# Patient Record
Sex: MALE | Race: WHITE | HISPANIC OR LATINO | Employment: UNEMPLOYED | URBAN - METROPOLITAN AREA
[De-identification: names, ages, dates, MRNs, and addresses within clinical notes are randomized per-mention and may not be internally consistent; named-entity substitution may affect disease eponyms.]

---

## 2017-09-11 PROBLEM — R22.32 MASS OF LEFT HAND: Status: ACTIVE | Noted: 2017-09-11

## 2017-10-02 PROBLEM — E66.9 OBESITY, CLASS I, BMI 30-34.9: Status: ACTIVE | Noted: 2017-10-02

## 2017-10-02 PROBLEM — R03.0 ELEVATED BLOOD PRESSURE READING: Status: ACTIVE | Noted: 2017-10-02

## 2020-05-23 ENCOUNTER — HOSPITAL ENCOUNTER (EMERGENCY)
Facility: HOSPITAL | Age: 40
Discharge: HOME | End: 2020-05-23
Attending: EMERGENCY MEDICINE

## 2020-05-23 VITALS
SYSTOLIC BLOOD PRESSURE: 162 MMHG | HEART RATE: 77 BPM | RESPIRATION RATE: 17 BRPM | TEMPERATURE: 98 F | DIASTOLIC BLOOD PRESSURE: 100 MMHG | OXYGEN SATURATION: 97 %

## 2020-05-23 DIAGNOSIS — H57.11 ACUTE RIGHT EYE PAIN: Primary | ICD-10-CM

## 2020-05-23 DIAGNOSIS — H10.9 CONJUNCTIVITIS OF RIGHT EYE, UNSPECIFIED CONJUNCTIVITIS TYPE: ICD-10-CM

## 2020-05-23 DIAGNOSIS — H01.001 BLEPHARITIS OF RIGHT UPPER EYELID, UNSPECIFIED TYPE: ICD-10-CM

## 2020-05-23 PROCEDURE — 99284 PR EMERGENCY DEPT VISIT,LEVEL IV: ICD-10-PCS | Mod: ,,, | Performed by: EMERGENCY MEDICINE

## 2020-05-23 PROCEDURE — 25000003 PHARM REV CODE 250: Performed by: EMERGENCY MEDICINE

## 2020-05-23 PROCEDURE — 99282 EMERGENCY DEPT VISIT SF MDM: CPT

## 2020-05-23 PROCEDURE — 99284 EMERGENCY DEPT VISIT MOD MDM: CPT | Mod: ,,, | Performed by: EMERGENCY MEDICINE

## 2020-05-23 RX ORDER — TETRACAINE HYDROCHLORIDE 5 MG/ML
2 SOLUTION OPHTHALMIC
Status: COMPLETED | OUTPATIENT
Start: 2020-05-23 | End: 2020-05-23

## 2020-05-23 RX ADMIN — FLUORESCEIN SODIUM 1 EACH: 1 STRIP OPHTHALMIC at 02:05

## 2020-05-23 RX ADMIN — TETRACAINE HYDROCHLORIDE 2 DROP: 5 SOLUTION OPHTHALMIC at 02:05

## 2020-05-23 NOTE — ED PROVIDER NOTES
Encounter Date: 5/23/2020       History     Chief Complaint   Patient presents with    Eye Problem     pt was cutting ceramic and thinks a piece went into his eye. No blurred vision; eye painful; redness noted.      HPI   Mr. Snyder is a 40 y.o. male with HTN here today with right eye pain. Reports 2-3 days ago he was doing yard work when something ceramic his right eye.  Complaining of burning eye pain since then.  Feels like there is something stuck in there.  Went to ED earlier tonight where they had normal exam.  His right eye was thoroughly irrigated and there were no foreign body seen.  No corneal abrasions.  Was discharged on erythromycin ointment.  Reports he is still having pain.  And thus he came here for evaluation.  Denies changes in vision, numbness, tingling, weakness.     Review of patient's allergies indicates:  No Known Allergies  Past Medical History:   Diagnosis Date    Hypertension     Mass of left hand     Obesity      No past surgical history on file.  No family history on file.  Social History     Tobacco Use    Smoking status: Never Smoker    Smokeless tobacco: Never Used   Substance Use Topics    Alcohol use: No    Drug use: No     Review of Systems   Constitutional: Negative for fever.   HENT: Negative for sore throat.    Eyes: Positive for pain, discharge and redness. Negative for photophobia, itching and visual disturbance.   Respiratory: Negative for shortness of breath.    Cardiovascular: Negative for chest pain.   Gastrointestinal: Negative for nausea.   Genitourinary: Negative for dysuria.   Musculoskeletal: Negative for back pain.   Skin: Negative for rash.   Neurological: Negative for weakness.   Hematological: Does not bruise/bleed easily.       Physical Exam     Initial Vitals [05/23/20 0234]   BP Pulse Resp Temp SpO2   (!) 162/100 77 17 98 °F (36.7 °C) 97 %      MAP       --         Physical Exam    Nursing note and vitals reviewed.  Constitutional: He appears  well-developed and well-nourished. He is not diaphoretic. No distress.   HENT:   Head: Normocephalic and atraumatic.   Right Ear: External ear normal.   Left Ear: External ear normal.   Eyes: EOM are normal. Pupils are equal, round, and reactive to light. Lids are everted and swept, no foreign bodies found. Right conjunctiva is injected. Left conjunctiva is injected.   Slit lamp exam:       The right eye shows fluorescein uptake (diffuse). The right eye shows no corneal abrasion, no corneal ulcer, no foreign body, no hyphema and no hypopyon.   Small subconjunctival hemorrhage at 12 o clock   Neck: Neck supple.   Cardiovascular: Normal rate, regular rhythm, normal heart sounds and intact distal pulses.   Pulmonary/Chest: Breath sounds normal. No respiratory distress. He has no wheezes. He has no rhonchi. He has no rales.   Abdominal: Soft. He exhibits no distension. There is no tenderness. There is no rebound and no guarding.   Neurological: He is alert and oriented to person, place, and time. GCS score is 15. GCS eye subscore is 4. GCS verbal subscore is 5. GCS motor subscore is 6.   Skin: Skin is warm. Capillary refill takes less than 2 seconds. No rash noted.   Psychiatric: He has a normal mood and affect.         ED Course   Procedures  Labs Reviewed - No data to display       Imaging Results    None          Medical Decision Making:   History:   Old Medical Records: I decided to obtain old medical records.  Old Records Summarized: other records.       <> Summary of Records: Seen at [facility] ED earlier with negative exam.  ED Management:  Here with right eye pain.  Visual acuity normal.  No obvious foreign body on exam.  Inverted lids and swept with out foreign body being present.  Was thoroughly irrigated.  No evidence of corneal abrasion.  Has diffuse uptake consistent with conjunctivitis.  Will discharge with previous treatment.  Stable for discharge this time.  Return precautions discussed.                                  Clinical Impression:       ICD-10-CM ICD-9-CM   1. Acute right eye pain H57.11 379.91   2. Blepharitis of right upper eyelid, unspecified type H01.001 373.00   3. Conjunctivitis of right eye, unspecified conjunctivitis type H10.9 372.30             ED Disposition Condition    Discharge Stable        ED Prescriptions     None        Follow-up Information     Follow up With Specialties Details Why Contact Info    Rick Gomez Jr., MD Family Medicine  As needed [address] [facility] [address]  [phone]      [facility] Emergency Medicine  If symptoms worsen [facility] [facility] [address] [phone]                                     Derek Doan MD  05/23/20 0332       Derek Doan MD  05/23/20 0337

## 2022-08-18 ENCOUNTER — HOSPITAL ENCOUNTER (EMERGENCY)
Facility: HOSPITAL | Age: 42
Discharge: HOME | End: 2022-08-18
Attending: EMERGENCY MEDICINE

## 2022-08-18 VITALS
SYSTOLIC BLOOD PRESSURE: 164 MMHG | RESPIRATION RATE: 18 BRPM | WEIGHT: 187 LBS | HEART RATE: 66 BPM | BODY MASS INDEX: 30.05 KG/M2 | OXYGEN SATURATION: 99 % | DIASTOLIC BLOOD PRESSURE: 98 MMHG | HEIGHT: 66 IN | TEMPERATURE: 99 F

## 2022-08-18 DIAGNOSIS — R51.9 NONINTRACTABLE HEADACHE, UNSPECIFIED CHRONICITY PATTERN, UNSPECIFIED HEADACHE TYPE: ICD-10-CM

## 2022-08-18 DIAGNOSIS — R07.89 CHEST WALL PAIN: Primary | ICD-10-CM

## 2022-08-18 DIAGNOSIS — R07.9 CHEST PAIN: ICD-10-CM

## 2022-08-18 DIAGNOSIS — K21.9 GASTROESOPHAGEAL REFLUX DISEASE, UNSPECIFIED WHETHER ESOPHAGITIS PRESENT: ICD-10-CM

## 2022-08-18 DIAGNOSIS — R07.9 LEFT-SIDED CHEST PAIN: ICD-10-CM

## 2022-08-18 DIAGNOSIS — R79.89 ELEVATED LFTS: ICD-10-CM

## 2022-08-18 LAB
ALBUMIN SERPL BCP-MCNC: 4.8 G/DL (ref 3.5–5.2)
ALP SERPL-CCNC: 95 U/L (ref 55–135)
ALT SERPL W/O P-5'-P-CCNC: 98 U/L (ref 10–44)
ANION GAP SERPL CALC-SCNC: 10 MMOL/L (ref 8–16)
AST SERPL-CCNC: 42 U/L (ref 10–40)
BASOPHILS # BLD AUTO: 0.03 K/UL (ref 0–0.2)
BASOPHILS NFR BLD: 0.4 % (ref 0–1.9)
BILIRUB SERPL-MCNC: 0.9 MG/DL (ref 0.1–1)
BUN SERPL-MCNC: 16 MG/DL (ref 6–20)
CALCIUM SERPL-MCNC: 10.6 MG/DL (ref 8.7–10.5)
CHLORIDE SERPL-SCNC: 100 MMOL/L (ref 95–110)
CO2 SERPL-SCNC: 28 MMOL/L (ref 23–29)
CREAT SERPL-MCNC: 0.8 MG/DL (ref 0.5–1.4)
DIFFERENTIAL METHOD: NORMAL
EOSINOPHIL # BLD AUTO: 0 K/UL (ref 0–0.5)
EOSINOPHIL NFR BLD: 0.5 % (ref 0–8)
ERYTHROCYTE [DISTWIDTH] IN BLOOD BY AUTOMATED COUNT: 12.9 % (ref 11.5–14.5)
EST. GFR  (NO RACE VARIABLE): >60 ML/MIN/1.73 M^2
GLUCOSE SERPL-MCNC: 180 MG/DL (ref 70–110)
HCT VFR BLD AUTO: 43.5 % (ref 40–54)
HGB BLD-MCNC: 15.1 G/DL (ref 14–18)
IMM GRANULOCYTES # BLD AUTO: 0.02 K/UL (ref 0–0.04)
IMM GRANULOCYTES NFR BLD AUTO: 0.3 % (ref 0–0.5)
LYMPHOCYTES # BLD AUTO: 1.6 K/UL (ref 1–4.8)
LYMPHOCYTES NFR BLD: 20.9 % (ref 18–48)
MCH RBC QN AUTO: 30 PG (ref 27–31)
MCHC RBC AUTO-ENTMCNC: 34.7 G/DL (ref 32–36)
MCV RBC AUTO: 86 FL (ref 82–98)
MONOCYTES # BLD AUTO: 0.6 K/UL (ref 0.3–1)
MONOCYTES NFR BLD: 8.1 % (ref 4–15)
NEUTROPHILS # BLD AUTO: 5.3 K/UL (ref 1.8–7.7)
NEUTROPHILS NFR BLD: 69.8 % (ref 38–73)
NRBC BLD-RTO: 0 /100 WBC
PLATELET # BLD AUTO: 270 K/UL (ref 150–450)
PMV BLD AUTO: 9.5 FL (ref 9.2–12.9)
POTASSIUM SERPL-SCNC: 3.9 MMOL/L (ref 3.5–5.1)
PROT SERPL-MCNC: 8.5 G/DL (ref 6–8.4)
RBC # BLD AUTO: 5.04 M/UL (ref 4.6–6.2)
SODIUM SERPL-SCNC: 138 MMOL/L (ref 136–145)
TROPONIN I SERPL DL<=0.01 NG/ML-MCNC: <0.006 NG/ML (ref 0–0.03)
WBC # BLD AUTO: 7.52 K/UL (ref 3.9–12.7)

## 2022-08-18 PROCEDURE — 25000003 PHARM REV CODE 250: Performed by: PHYSICIAN ASSISTANT

## 2022-08-18 PROCEDURE — 25000003 PHARM REV CODE 250: Performed by: EMERGENCY MEDICINE

## 2022-08-18 PROCEDURE — 96374 THER/PROPH/DIAG INJ IV PUSH: CPT

## 2022-08-18 PROCEDURE — 99284 PR EMERGENCY DEPT VISIT,LEVEL IV: ICD-10-PCS | Mod: ,,, | Performed by: PHYSICIAN ASSISTANT

## 2022-08-18 PROCEDURE — 93010 ELECTROCARDIOGRAM REPORT: CPT | Mod: ,,, | Performed by: INTERNAL MEDICINE

## 2022-08-18 PROCEDURE — 63600175 PHARM REV CODE 636 W HCPCS: Performed by: PHYSICIAN ASSISTANT

## 2022-08-18 PROCEDURE — 96375 TX/PRO/DX INJ NEW DRUG ADDON: CPT

## 2022-08-18 PROCEDURE — 93005 ELECTROCARDIOGRAM TRACING: CPT

## 2022-08-18 PROCEDURE — 80053 COMPREHEN METABOLIC PANEL: CPT | Performed by: EMERGENCY MEDICINE

## 2022-08-18 PROCEDURE — 99284 EMERGENCY DEPT VISIT MOD MDM: CPT | Mod: ,,, | Performed by: PHYSICIAN ASSISTANT

## 2022-08-18 PROCEDURE — 96361 HYDRATE IV INFUSION ADD-ON: CPT

## 2022-08-18 PROCEDURE — 99285 EMERGENCY DEPT VISIT HI MDM: CPT | Mod: 25

## 2022-08-18 PROCEDURE — 84484 ASSAY OF TROPONIN QUANT: CPT | Performed by: EMERGENCY MEDICINE

## 2022-08-18 PROCEDURE — 93010 EKG 12-LEAD: ICD-10-PCS | Mod: ,,, | Performed by: INTERNAL MEDICINE

## 2022-08-18 PROCEDURE — 85025 COMPLETE CBC W/AUTO DIFF WBC: CPT | Performed by: EMERGENCY MEDICINE

## 2022-08-18 RX ORDER — OMEPRAZOLE 20 MG/1
20 CAPSULE, DELAYED RELEASE ORAL DAILY
Qty: 30 CAPSULE | Refills: 0 | Status: SHIPPED | OUTPATIENT
Start: 2022-08-18 | End: 2023-08-18

## 2022-08-18 RX ORDER — METOCLOPRAMIDE HYDROCHLORIDE 5 MG/ML
10 INJECTION INTRAMUSCULAR; INTRAVENOUS
Status: COMPLETED | OUTPATIENT
Start: 2022-08-18 | End: 2022-08-18

## 2022-08-18 RX ORDER — MAG HYDROX/ALUMINUM HYD/SIMETH 200-200-20
30 SUSPENSION, ORAL (FINAL DOSE FORM) ORAL ONCE
Status: COMPLETED | OUTPATIENT
Start: 2022-08-18 | End: 2022-08-18

## 2022-08-18 RX ORDER — MAG HYDROX/ALUMINUM HYD/SIMETH 200-200-20
30 SUSPENSION, ORAL (FINAL DOSE FORM) ORAL
Status: DISCONTINUED | OUTPATIENT
Start: 2022-08-18 | End: 2022-08-18

## 2022-08-18 RX ORDER — LIDOCAINE 50 MG/G
1 PATCH TOPICAL ONCE
Status: DISCONTINUED | OUTPATIENT
Start: 2022-08-18 | End: 2022-08-18 | Stop reason: HOSPADM

## 2022-08-18 RX ORDER — KETOROLAC TROMETHAMINE 30 MG/ML
15 INJECTION, SOLUTION INTRAMUSCULAR; INTRAVENOUS
Status: COMPLETED | OUTPATIENT
Start: 2022-08-18 | End: 2022-08-18

## 2022-08-18 RX ORDER — ACETAMINOPHEN 500 MG
1000 TABLET ORAL
Status: DISCONTINUED | OUTPATIENT
Start: 2022-08-18 | End: 2022-08-18

## 2022-08-18 RX ORDER — LIDOCAINE HYDROCHLORIDE 20 MG/ML
15 SOLUTION OROPHARYNGEAL ONCE
Status: COMPLETED | OUTPATIENT
Start: 2022-08-18 | End: 2022-08-18

## 2022-08-18 RX ADMIN — SODIUM CHLORIDE 1000 ML: 0.9 INJECTION, SOLUTION INTRAVENOUS at 02:08

## 2022-08-18 RX ADMIN — LIDOCAINE 1 PATCH: 50 PATCH TOPICAL at 02:08

## 2022-08-18 RX ADMIN — ALUMINUM HYDROXIDE, MAGNESIUM HYDROXIDE, AND SIMETHICONE 30 ML: 200; 200; 20 SUSPENSION ORAL at 02:08

## 2022-08-18 RX ADMIN — KETOROLAC TROMETHAMINE 15 MG: 30 INJECTION, SOLUTION INTRAMUSCULAR; INTRAVENOUS at 02:08

## 2022-08-18 RX ADMIN — LIDOCAINE HYDROCHLORIDE 15 ML: 20 SOLUTION ORAL; TOPICAL at 02:08

## 2022-08-18 RX ADMIN — METOCLOPRAMIDE 10 MG: 5 INJECTION, SOLUTION INTRAMUSCULAR; INTRAVENOUS at 02:08

## 2022-08-18 NOTE — DISCHARGE INSTRUCTIONS
Take ibuprofen 600-800 mg every 6 hours as needed with foods for anti-inflammatory relief.  You can take acetaminophen/tylenol 650 mg every 6 hours or 1000 mg every 8 hours for added relief.  Apply ice to the area for 10-20 minutes every 4 hours. You can apply heat 2 days after for the same duration and frequency.  Follow up with PCP if your symptoms do not improve.   Return to the ER for new or worsening symptoms.  Labs Reviewed   COMPREHENSIVE METABOLIC PANEL - Abnormal; Notable for the following components:       Result Value    Glucose 180 (*)     Calcium 10.6 (*)     Total Protein 8.5 (*)     AST 42 (*)     ALT 98 (*)     All other components within normal limits   CBC W/ AUTO DIFFERENTIAL   TROPONIN I     Imaging Results              X-Ray Chest PA And Lateral (Final result)  Result time 08/18/22 15:28:38      Final result by Frnaky Hensley MD (08/18/22 15:28:38)                   Impression:      See above      Electronically signed by: Franky Hensley MD  Date:    08/18/2022  Time:    15:28               Narrative:    EXAMINATION:  XR CHEST PA AND LATERAL    CLINICAL HISTORY:  Chest pain, unspecified    TECHNIQUE:  PA and lateral views of the chest were performed.    COMPARISON:  None    FINDINGS:  Heart size normal.  The lungs are clear.  No pleural effusion

## 2022-08-18 NOTE — Clinical Note
"Rod Ruggiero" Claudio was seen and treated in our emergency department on 8/18/2022.  He may return to work on 08/22/2022.       If you have any questions or concerns, please don't hesitate to call.      Wendy Hairston PA-C"

## 2022-08-18 NOTE — ED NOTES
Patient identifiers verified and correct for Mr Snyder  C/C: Left CP SEE NNAPPEARANCE: awake and alert in NAD.  SKIN: warm, dry and intact. No breakdown or bruising.  MUSCULOSKELETAL: Patient moving all extremities spontaneously, no obvious swelling or deformities noted. Ambulates independently.  RESPIRATORY: Denies shortness of breath.Respirations unlabored.   CARDIAC: Positive left CP, 2+ distal pulses; no peripheral edema  ABDOMEN: S/ND/NT, Denies nausea  : voids spontaneously, denies difficulty  Neurologic: AAO x 4; follows commands equal strength in all extremities; denies numbness/tingling. Denies dizziness denies weakness

## 2022-08-18 NOTE — PROVIDER PROGRESS NOTES - EMERGENCY DEPT.
Encounter Date: 8/18/2022    ED Physician Progress Notes         EKG - STEMI Decision  Initial Reading: No STEMI present.  Response: No Action Needed.

## 2022-08-18 NOTE — FIRST PROVIDER EVALUATION
"Medical screening exam completed.  I have conducted a focused provider triage encounter, findings are as follows:    Brief history of present illness: 42 M here for substernal chest pain since last night.  No radiation. Also reports headache, upper abdominal pain.  Has pain last week but took his blood pressure medicine then pain resolved.     Vitals:    08/18/22 1208   BP: (!) 174/106   Pulse: 88   Resp: 14   Temp: 98.5 °F (36.9 °C)   SpO2: 99%   Weight: 84.8 kg (187 lb)   Height: 5' 6" (1.676 m)       Pertinent physical exam:  Well appearing, no distress    Brief workup plan:  Labs, ekg, gi cocktail    Preliminary workup initiated; this workup will be continued and followed by the physician or advanced practice provider that is assigned to the patient when roomed.  "

## 2022-08-18 NOTE — ED PROVIDER NOTES
Encounter Date: 8/18/2022       History     Chief Complaint   Patient presents with    Chest Pain     Pt c/o chest pain that began on Friday.  Also c/o abdominal pain and headache.     2:12 PM  Patient is a 42-year-old male with a history of HTN, dm, HLD who [place] [facility] [place] with multiple complaints.  He states for the past 1 month he has noted intermittent pains to his left chest.      Last week, he noted that his symptoms were more frequent.  He has noted while at work, but also mainly at home in the evenings when he is resting.  He has had it when he is even sleeping.  Currently complains of 4/10 pain to the area.  In addition he has had a headache for the past 6 days.  He states on day 1 he had mild bilateral temporal pain, and next day he felt like his pain became worse.  Currently it is located to his frontal head and sometimes to his teeth.  He is complaining of 10/10 pain to his head.  He has also had generalized abdominal pain onset about 5 days ago.  His bowel movements have been very little and small.  No diarrhea.  He denies tobacco use.  Drinks about 1 beer 1-2 times a week.  He has tried over-the-counter Tylenol, ibuprofen, [place] with improvement in his pain which returns 2 hours later. He drove himself to the ED. He had neg home covid test about 2 days ago.        Review of patient's allergies indicates:  No Known Allergies  Past Medical History:   Diagnosis Date    Hypertension     Mass of left hand     Obesity      History reviewed. No pertinent surgical history.  History reviewed. No pertinent family history.  Social History     Tobacco Use    Smoking status: Never Smoker    Smokeless tobacco: Never Used   Substance Use Topics    Alcohol use: No    Drug use: No     Review of Systems   Constitutional: Positive for activity change and appetite change. Negative for chills, diaphoresis and fever.   HENT: Negative for sore throat.    Respiratory: Positive for cough and shortness of  breath.    Cardiovascular: Positive for chest pain.   Gastrointestinal: Positive for abdominal pain. Negative for diarrhea, nausea and vomiting.   Genitourinary: Negative for dysuria, frequency and hematuria.   Musculoskeletal: Negative for back pain.   Skin: Negative for rash.   Neurological: Negative for weakness, numbness and headaches.   Hematological: Does not bruise/bleed easily.       Physical Exam     Initial Vitals [08/18/22 1208]   BP Pulse Resp Temp SpO2   (!) 174/106 88 14 98.5 °F (36.9 °C) 99 %      MAP       --         Physical Exam    Vitals reviewed.  Constitutional: He appears well-developed and well-nourished. He is not diaphoretic. He is cooperative.  Non-toxic appearance. He does not have a sickly appearance. He does not appear ill. No distress. Face mask in place.   HENT:   Head: Normocephalic and atraumatic.   Nose: Nose normal.   Mouth/Throat: No trismus in the jaw.   Eyes: Conjunctivae and EOM are normal.   Neck:   Normal range of motion.  Cardiovascular: Normal rate and regular rhythm.   Pulmonary/Chest: Breath sounds normal. No accessory muscle usage. No tachypnea. No respiratory distress. He has no wheezes. He has no rhonchi. He has no rales. He exhibits tenderness.     Abdominal: He exhibits no distension.   Musculoskeletal:         General: Normal range of motion.      Cervical back: Normal range of motion.     Neurological: He is alert. He has normal strength.   Skin: Skin is dry. No pallor.         ED Course   Procedures  Labs Reviewed   COMPREHENSIVE METABOLIC PANEL - Abnormal; Notable for the following components:       Result Value    Glucose 180 (*)     Calcium 10.6 (*)     Total Protein 8.5 (*)     AST 42 (*)     ALT 98 (*)     All other components within normal limits   CBC W/ AUTO DIFFERENTIAL   TROPONIN I        ECG Results          EKG 12-lead (Final result)  Result time 08/18/22 13:56:59    Final result by Interface, Lab In [facility] (08/18/22 13:56:59)                  Narrative:    Test Reason : R07.9,    Vent. Rate : 076 BPM     Atrial Rate : 076 BPM     P-R Int : 152 ms          QRS Dur : 088 ms      QT Int : 376 ms       P-R-T Axes : 040 005 041 degrees     QTc Int : 423 ms    Normal sinus rhythm  Possible Left atrial enlargement  Borderline Abnormal ECG  When compared with ECG of 11-SEP-2017 13:32,  Non-specific change in ST segment in Inferior leads  Confirmed by LAM NORMAN MD ([id number]) on 8/18/2022 1:56:52 PM    Referred By: [facility]           Confirmed By:LAM NORMAN MD                            Imaging Results          X-Ray Chest PA And Lateral (Final result)  Result time 08/18/22 15:28:38    Final result by Franky Hensley MD (08/18/22 15:28:38)                 Impression:      See above      Electronically signed by: Franky Hensley MD  Date:    08/18/2022  Time:    15:28             Narrative:    EXAMINATION:  XR CHEST PA AND LATERAL    CLINICAL HISTORY:  Chest pain, unspecified    TECHNIQUE:  PA and lateral views of the chest were performed.    COMPARISON:  None    FINDINGS:  Heart size normal.  The lungs are clear.  No pleural effusion                                 Medications   aluminum-magnesium hydroxide-simethicone 200-200-20 mg/5 mL suspension 30 mL (30 mLs Oral Given 8/18/22 1449)     And   LIDOcaine HCl 2% oral solution 15 mL (15 mLs Oral Given 8/18/22 1449)   sodium chloride 0.9% bolus 1,000 mL (0 mLs Intravenous Stopped 8/18/22 1600)   metoclopramide HCl injection 10 mg (10 mg Intravenous Given 8/18/22 1442)   ketorolac injection 15 mg (15 mg Intravenous Given 8/18/22 1442)     Medical Decision Making:   Initial Assessment:   Patient is a 42-year-old male with a history of HTN, dm, HLD who presents to [facility] ED with multiple complaints.  He states for the past 1 month he has noted intermittent pains to his left chest.   Differential Diagnosis:   Includes but is not limited to palpitations, GERD, gastritis, anxiety, MSK pain, ACS, less likely PE,  tension headache, migraine headache, complex headache, hypertensive headache.  Patient's headache onset slow and worse in the next day.  Not characteristic of SAH.  No focal neural deficits to suggest other neurological abnormality.  Independently Interpreted Test(s):   I have ordered and independently interpreted EKG Reading(s) - see summary below  Clinical Tests:   Lab Tests: Ordered and Reviewed  Radiological Study: Ordered and Reviewed  Medical Tests: Ordered and Reviewed  ED Management:  On my independent interpretation, EKG with NSR at 76 ppm.  Possible left atrial enlargement.  Normal intervals.  No ischemic changes.  No STEMI.    Will initiate workup and continue monitor.              ED Course as of 08/19/22 0830   Thu Aug 18, 2022   1408 BP(!): 174/106 [CL]   1408 Temp: 98.5 °F (36.9 °C) [CL]   1408 Pulse: 88 [CL]   1408 Resp: 14 [CL]   1408 SpO2: 99 % [CL]   1408 WBC: 7.52 [CL]   1408 Hemoglobin: 15.1 [CL]   1408 Sodium: 138 [CL]   1408 Potassium: 3.9 [CL]   1408 Glucose(!): 180 [CL]   1408 Creatinine: 0.8 [CL]   1408 BILIRUBIN TOTAL: 0.9 [CL]   1408 AST(!): 42 [CL]   1408 ALT(!): 98 [CL]   1408 Troponin I: <0.006 [CL]      ED Course User Index  [CL] Wendy Hairston PA-C           Chest x-ray without acute abnormalities.    Patient reassessed.  He was sleeping.  He states that he feels significantly improved.  Workup without any acute processes.   He could have signs of acid reflux given that he has had symptoms even at nice when he is resting and laying down versus MSK pain since he had some tenderness to his left pectoralis muscle and some that was reproducible with left shoulder range of motion.  I discussed etiologies of both.  Start GERD medication. OTC ibuprofen.  He apparently has been told that his liver enzymes have been elevated before.  We discussed close follow-up.  Discontinue alcohol as well as acetaminophen.  Stay hydrated.  Return to ED precautions were given.  All of his questions were  answered.  Patient comfortable with plan and stable for discharge.    Clinical Impression:   Final diagnoses:  [R07.9] Chest pain  [R07.9] Left-sided chest pain  [R07.89] Chest wall pain (Primary)  [R79.89] Elevated LFTs  [R51.9] Nonintractable headache, unspecified chronicity pattern, unspecified headache type  [K21.9] Gastroesophageal reflux disease, unspecified whether esophagitis present          ED Disposition Condition    Discharge Stable        ED Prescriptions     Medication Sig Dispense Start Date End Date Auth. Provider    omeprazole (PRILOSEC) 20 MG capsule Take 1 capsule (20 mg total) by mouth once daily. 30 capsule 8/18/2022 8/18/2023 Wendy Hairston PA-C        Follow-up Information     Follow up With Specialties Details Why Contact Info    [facility]  Schedule an appointment as soon as possible for a visit   [address] [facility] [address]  [phone]      [facility] - Emergency Dept Emergency Medicine  If symptoms worsen [address] [facility] [facility] [address] [phone]           Wendy Hairston PA-C  08/19/22 0826

## 2022-11-10 ENCOUNTER — OFFICE VISIT (OUTPATIENT)
Dept: ORTHOPEDICS | Facility: CLINIC | Age: 42
End: 2022-11-10

## 2022-11-10 VITALS — HEIGHT: 65 IN | WEIGHT: 189.13 LBS | BODY MASS INDEX: 31.51 KG/M2
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
## 2022-11-10 DIAGNOSIS — M25.512 ACUTE PAIN OF LEFT SHOULDER: ICD-10-CM

## 2022-11-10 DIAGNOSIS — M87.039 AVASCULAR NECROSIS OF LUNATE: ICD-10-CM

## 2022-11-10 DIAGNOSIS — M25.531 RIGHT WRIST PAIN: Primary | ICD-10-CM

## 2022-11-10 PROCEDURE — 99214 OFFICE O/P EST MOD 30 MIN: CPT | Mod: PBBFAC,PN | Performed by: PHYSICIAN ASSISTANT

## 2022-11-10 PROCEDURE — 99202 OFFICE O/P NEW SF 15 MIN: CPT | Mod: S$PBB,,, | Performed by: PHYSICIAN ASSISTANT

## 2022-11-10 PROCEDURE — 99202 PR OFFICE/OUTPT VISIT, NEW, LEVL II, 15-29 MIN: ICD-10-PCS | Mod: S$PBB,,, | Performed by: PHYSICIAN ASSISTANT

## 2022-11-10 PROCEDURE — 99999 PR PBB SHADOW E&M-EST. PATIENT-LVL IV: ICD-10-PCS | Mod: PBBFAC,,, | Performed by: PHYSICIAN ASSISTANT

## 2022-11-10 PROCEDURE — 99999 PR PBB SHADOW E&M-EST. PATIENT-LVL IV: CPT | Mod: PBBFAC,,, | Performed by: PHYSICIAN ASSISTANT

## 2022-11-10 RX ORDER — ATORVASTATIN CALCIUM 40 MG/1
40 TABLET, FILM COATED ORAL NIGHTLY
COMMUNITY
Start: 2022-09-12

## 2022-11-10 RX ORDER — METFORMIN HYDROCHLORIDE 500 MG/1
1000 TABLET, EXTENDED RELEASE ORAL 2 TIMES DAILY
COMMUNITY
Start: 2022-10-26
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
## 2022-12-12 ENCOUNTER — TELEPHONE (OUTPATIENT)
Dept: ORTHOPEDICS | Facility: CLINIC | Age: 42
End: 2022-12-12